# Patient Record
Sex: FEMALE | Race: WHITE | URBAN - METROPOLITAN AREA
[De-identification: names, ages, dates, MRNs, and addresses within clinical notes are randomized per-mention and may not be internally consistent; named-entity substitution may affect disease eponyms.]

---

## 2020-07-25 ENCOUNTER — TELEPHONE ENCOUNTER (OUTPATIENT)
Dept: URBAN - METROPOLITAN AREA CLINIC 13 | Facility: CLINIC | Age: 40
End: 2020-07-25

## 2020-07-26 ENCOUNTER — TELEPHONE ENCOUNTER (OUTPATIENT)
Dept: URBAN - METROPOLITAN AREA CLINIC 13 | Facility: CLINIC | Age: 40
End: 2020-07-26

## 2020-07-26 RX ORDER — PHENTERMINE HYDROCHLORIDE 30 MG/1
TAKE 1 TABLET BY MOUTH EVERY MORNING TABLET ORAL
Qty: 30 | Refills: 0 | Status: ACTIVE | COMMUNITY
Start: 2019-04-02

## 2020-07-26 RX ORDER — CEPHALEXIN 500 MG/1
CAPSULE ORAL
Qty: 21 | Refills: 0 | Status: ACTIVE | COMMUNITY
Start: 2019-06-14

## 2020-07-26 RX ORDER — SPIRONOLACTONE 25 MG/1
TAKE ONE TABLET TWICE DAILY TABLET ORAL
Qty: 60 | Refills: 0 | Status: ACTIVE | COMMUNITY
Start: 2018-11-08

## 2020-07-26 RX ORDER — TRIAMTERENE AND HYDROCHLOROTHIAZIDE 37.5; 25 MG/1; MG/1
TABLET ORAL
Qty: 30 | Refills: 0 | Status: ACTIVE | COMMUNITY
Start: 2019-02-04

## 2020-07-26 RX ORDER — BUSPIRONE HYDROCHLORIDE 10 MG/1
TABLET ORAL
Qty: 60 | Refills: 0 | Status: ACTIVE | COMMUNITY
Start: 2019-05-13

## 2020-07-26 RX ORDER — FLUCONAZOLE 200 MG/1
TABLET ORAL
Qty: 3 | Refills: 0 | Status: ACTIVE | COMMUNITY
Start: 2019-06-14

## 2020-07-26 RX ORDER — CIPROFLOXACIN HYDROCHLORIDE 500 MG/1
TABLET, FILM COATED ORAL
Qty: 20 | Refills: 0 | Status: ACTIVE | COMMUNITY
Start: 2019-05-05

## 2020-07-26 RX ORDER — FLUCONAZOLE 150 MG/1
TABLET ORAL
Qty: 3 | Refills: 0 | Status: ACTIVE | COMMUNITY
Start: 2019-04-13

## 2020-07-26 RX ORDER — AZITHROMYCIN DIHYDRATE 250 MG/1
TAKE 2 TABLETS BY MOUTH ON DAY 1, THEN TAKE 1 TABLET DAILY ON DAYS 2-5 TABLET, FILM COATED ORAL
Qty: 6 | Refills: 0 | Status: ACTIVE | COMMUNITY
Start: 2018-08-24

## 2020-07-26 RX ORDER — SPIRONOLACTONE 100 MG/1
TABLET, FILM COATED ORAL
Qty: 60 | Refills: 0 | Status: ACTIVE | COMMUNITY
Start: 2019-04-29

## 2020-07-26 RX ORDER — ESCITALOPRAM 10 MG/1
TABLET, FILM COATED ORAL
Qty: 30 | Refills: 0 | Status: ACTIVE | COMMUNITY
Start: 2019-06-14

## 2020-07-26 RX ORDER — PHENAZOPYRIDINE HYDROCHLORIDE 200 MG/1
TABLET, FILM COATED ORAL
Qty: 6 | Refills: 0 | Status: ACTIVE | COMMUNITY
Start: 2019-05-05

## 2020-07-26 RX ORDER — NITROFURANTOIN MONOHYDRATE/MACROCRYSTALLINE 25; 75 MG/1; MG/1
CAPSULE ORAL
Qty: 14 | Refills: 0 | Status: ACTIVE | COMMUNITY
Start: 2019-04-13

## 2020-07-26 RX ORDER — ESCITALOPRAM 20 MG/1
TABLET, FILM COATED ORAL
Qty: 30 | Refills: 0 | Status: ACTIVE | COMMUNITY
Start: 2019-04-15

## 2020-07-26 RX ORDER — NORETHINDRONE ACETATE 5 MG/1
TAKE ONE TABLET EVERY DAY TABLET ORAL
Qty: 30 | Refills: 0 | Status: ACTIVE | COMMUNITY
Start: 2018-11-08

## 2020-07-26 RX ORDER — SULFAMETHOXAZOLE AND TRIMETHOPRIM 800; 160 MG/1; MG/1
TK 1 T PO  BID FOR 10 DAYS TABLET ORAL
Qty: 20 | Refills: 0 | Status: ACTIVE | COMMUNITY
Start: 2018-09-22

## 2020-07-26 RX ORDER — LEVOTHYROXINE, LIOTHYRONINE 38; 9 UG/1; UG/1
TABLET ORAL
Qty: 30 | Refills: 0 | Status: ACTIVE | COMMUNITY
Start: 2019-04-30

## 2023-02-08 ENCOUNTER — OFFICE VISIT (OUTPATIENT)
Dept: URBAN - METROPOLITAN AREA CLINIC 107 | Facility: CLINIC | Age: 43
End: 2023-02-08

## 2023-02-08 RX ORDER — LEVOTHYROXINE, LIOTHYRONINE 38; 9 UG/1; UG/1
TABLET ORAL
Qty: 30 | Refills: 0 | Status: ACTIVE | COMMUNITY
Start: 2019-04-30

## 2023-02-08 RX ORDER — NORETHINDRONE ACETATE 5 MG/1
TAKE ONE TABLET EVERY DAY TABLET ORAL
Qty: 30 | Refills: 0 | Status: ACTIVE | COMMUNITY
Start: 2018-11-08

## 2023-02-08 RX ORDER — ESCITALOPRAM 20 MG/1
TABLET, FILM COATED ORAL
Qty: 30 | Refills: 0 | Status: ACTIVE | COMMUNITY
Start: 2019-04-15

## 2023-02-08 RX ORDER — CEPHALEXIN 500 MG/1
CAPSULE ORAL
Qty: 21 | Refills: 0 | Status: ACTIVE | COMMUNITY
Start: 2019-06-14

## 2023-02-08 RX ORDER — PHENAZOPYRIDINE HYDROCHLORIDE 200 MG/1
TABLET, FILM COATED ORAL
Qty: 6 | Refills: 0 | Status: ACTIVE | COMMUNITY
Start: 2019-05-05

## 2023-02-08 RX ORDER — SPIRONOLACTONE 100 MG/1
TABLET, FILM COATED ORAL
Qty: 60 | Refills: 0 | Status: ACTIVE | COMMUNITY
Start: 2019-04-29

## 2023-02-08 RX ORDER — FLUCONAZOLE 200 MG/1
TABLET ORAL
Qty: 3 | Refills: 0 | Status: ACTIVE | COMMUNITY
Start: 2019-06-14

## 2023-02-08 RX ORDER — TRIAMTERENE AND HYDROCHLOROTHIAZIDE 37.5; 25 MG/1; MG/1
TABLET ORAL
Qty: 30 | Refills: 0 | Status: ACTIVE | COMMUNITY
Start: 2019-02-04

## 2023-02-08 RX ORDER — SULFAMETHOXAZOLE AND TRIMETHOPRIM 800; 160 MG/1; MG/1
TK 1 T PO  BID FOR 10 DAYS TABLET ORAL
Qty: 20 | Refills: 0 | Status: ACTIVE | COMMUNITY
Start: 2018-09-22

## 2023-02-08 RX ORDER — NITROFURANTOIN MONOHYDRATE/MACROCRYSTALLINE 25; 75 MG/1; MG/1
CAPSULE ORAL
Qty: 14 | Refills: 0 | Status: ACTIVE | COMMUNITY
Start: 2019-04-13

## 2023-02-08 RX ORDER — ESCITALOPRAM 10 MG/1
TABLET, FILM COATED ORAL
Qty: 30 | Refills: 0 | Status: ACTIVE | COMMUNITY
Start: 2019-06-14

## 2023-02-08 RX ORDER — AZITHROMYCIN DIHYDRATE 250 MG/1
TAKE 2 TABLETS BY MOUTH ON DAY 1, THEN TAKE 1 TABLET DAILY ON DAYS 2-5 TABLET, FILM COATED ORAL
Qty: 6 | Refills: 0 | Status: ACTIVE | COMMUNITY
Start: 2018-08-24

## 2023-02-08 RX ORDER — SPIRONOLACTONE 25 MG/1
TAKE ONE TABLET TWICE DAILY TABLET ORAL
Qty: 60 | Refills: 0 | Status: ACTIVE | COMMUNITY
Start: 2018-11-08

## 2023-02-08 RX ORDER — CIPROFLOXACIN HYDROCHLORIDE 500 MG/1
TABLET, FILM COATED ORAL
Qty: 20 | Refills: 0 | Status: ACTIVE | COMMUNITY
Start: 2019-05-05

## 2023-02-08 RX ORDER — PHENTERMINE HYDROCHLORIDE 30 MG/1
TAKE 1 TABLET BY MOUTH EVERY MORNING TABLET ORAL
Qty: 30 | Refills: 0 | Status: ACTIVE | COMMUNITY
Start: 2019-04-02

## 2023-02-08 RX ORDER — BUSPIRONE HYDROCHLORIDE 10 MG/1
TABLET ORAL
Qty: 60 | Refills: 0 | Status: ACTIVE | COMMUNITY
Start: 2019-05-13

## 2023-02-08 RX ORDER — FLUCONAZOLE 150 MG/1
TABLET ORAL
Qty: 3 | Refills: 0 | Status: ACTIVE | COMMUNITY
Start: 2019-04-13

## 2023-02-08 NOTE — HPI-TODAY'S VISIT:
43yo female referred by Dr. Keira Starr for evaluation of diarrhea and rectal bleeding.  A copy of this document is being forwarded to the referring provider.  She was previously seen in our office in 2004 by Dr. East for colitis suspected to be related to Crohn's. She was briefly treated with Asacol during that time, but ultimately lost to office follow-up.

## 2023-02-09 ENCOUNTER — OFFICE VISIT (OUTPATIENT)
Dept: URBAN - METROPOLITAN AREA CLINIC 113 | Facility: CLINIC | Age: 43
End: 2023-02-09

## 2023-02-14 ENCOUNTER — WEB ENCOUNTER (OUTPATIENT)
Dept: URBAN - METROPOLITAN AREA CLINIC 107 | Facility: CLINIC | Age: 43
End: 2023-02-14

## 2023-02-20 ENCOUNTER — OFFICE VISIT (OUTPATIENT)
Dept: URBAN - METROPOLITAN AREA CLINIC 107 | Facility: CLINIC | Age: 43
End: 2023-02-20
Payer: COMMERCIAL

## 2023-02-20 VITALS
RESPIRATION RATE: 8 BRPM | SYSTOLIC BLOOD PRESSURE: 111 MMHG | BODY MASS INDEX: 27.28 KG/M2 | WEIGHT: 180 LBS | TEMPERATURE: 98.2 F | HEIGHT: 68 IN | DIASTOLIC BLOOD PRESSURE: 74 MMHG | HEART RATE: 71 BPM

## 2023-02-20 DIAGNOSIS — K60.2 ANAL FISSURE: ICD-10-CM

## 2023-02-20 DIAGNOSIS — R93.3 ABNORMAL COLONOSCOPY: ICD-10-CM

## 2023-02-20 DIAGNOSIS — R10.12 LUQ PAIN: ICD-10-CM

## 2023-02-20 PROCEDURE — 99204 OFFICE O/P NEW MOD 45 MIN: CPT | Performed by: STUDENT IN AN ORGANIZED HEALTH CARE EDUCATION/TRAINING PROGRAM

## 2023-02-20 RX ORDER — DEXTROAMPHETAMINE SACCHARATE, AMPHETAMINE ASPARTATE, DEXTROAMPHETAMINE SULFATE, AND AMPHETAMINE SULFATE 2.5; 2.5; 2.5; 2.5 MG/1; MG/1; MG/1; MG/1
1 TABLET TABLET ORAL TWICE A DAY
Status: ACTIVE | COMMUNITY

## 2023-02-20 RX ORDER — PROPRANOLOL HYDROCHLORIDE 60 MG/1
1 TABLET TABLET ORAL ONCE A DAY
Status: ACTIVE | COMMUNITY

## 2023-02-20 RX ORDER — DEXTROAMPHETAMINE SACCHARATE, AMPHETAMINE ASPARTATE, DEXTROAMPHETAMINE SULFATE, AND AMPHETAMINE SULFATE 5; 5; 5; 5 MG/1; MG/1; MG/1; MG/1
1 TABLET TABLET ORAL TWICE A DAY
Status: ACTIVE | COMMUNITY

## 2023-02-20 RX ORDER — PANTOPRAZOLE SODIUM 40 MG/1
1 TABLET TABLET, DELAYED RELEASE ORAL ONCE A DAY
Qty: 90 TABLET | Refills: 0 | OUTPATIENT
Start: 2023-02-20

## 2023-02-20 NOTE — HPI-SCREENING
Initial visit 2/20/2023; PCP Dr. Starr Ms. Dia is a 42-year-old female with essential tremor who presents for evaluation of rectal bleeding and rectal pain. Based on records her last endoscopy was a flex sig 11/6/2003 performed for colitis noted multiple aphthous ulcers with erythema and mucosal friability in a patchy distribution involving the rectum and sigmoid colon.  Biopsies obtained consistent with acute colitis with focal crypt destruction. She was not given a formal diagnosis of IBD. Patient had given birth in 2014 and at that time was diagnosed with bleeding hemorrhoids. She had seen colorectal surgery to did a surgical procedure for her and she feels that her sphincter is now tight however bleeding had resolved, unclear what procedure was perfored for her however she states the recover process was painful. 4 months ago bleeding and rectal pain returned, she will have bleeding in her underwear and in the toilet bowl even without bowel movements. REctal pain with defecation described as sharp. She was given steroid suppository with no improvement. No weight loss. She also complains of an intermittent sharp LUQ pain, not associated with meals or bowel movements. Ongoing for 4 months. No family history og GI malignancies. No fevers/chills, nausea/vomiting, dysphagia/odynophagia, diarrhea/constipation.

## 2023-02-20 NOTE — PHYSICAL EXAM GASTROINTESTINAL
Abdomen , soft, nontender, nondistended , no guarding or rigidity , no masses palpable , normal bowel sounds , Liver and Spleen , no hepatomegaly present , no hepatosplenomegaly , liver nontender , spleen not palpable , Rectal , posterior anal fissure, external anal skin tags, increased sphinter tone, no masses on ANUM, no blood on gloved finger

## 2023-04-04 ENCOUNTER — WEB ENCOUNTER (OUTPATIENT)
Dept: URBAN - METROPOLITAN AREA CLINIC 107 | Facility: CLINIC | Age: 43
End: 2023-04-04

## 2023-04-04 ENCOUNTER — OFFICE VISIT (OUTPATIENT)
Dept: URBAN - METROPOLITAN AREA CLINIC 107 | Facility: CLINIC | Age: 43
End: 2023-04-04
Payer: COMMERCIAL

## 2023-04-04 VITALS
TEMPERATURE: 96.7 F | SYSTOLIC BLOOD PRESSURE: 115 MMHG | HEIGHT: 68 IN | BODY MASS INDEX: 28.04 KG/M2 | HEART RATE: 74 BPM | WEIGHT: 185 LBS | DIASTOLIC BLOOD PRESSURE: 80 MMHG | RESPIRATION RATE: 18 BRPM

## 2023-04-04 DIAGNOSIS — R93.3 ABNORMAL COLONOSCOPY: ICD-10-CM

## 2023-04-04 DIAGNOSIS — K62.5 RECTAL BLEEDING: ICD-10-CM

## 2023-04-04 DIAGNOSIS — K62.89 RECTAL PAIN: ICD-10-CM

## 2023-04-04 DIAGNOSIS — K60.2 ANAL FISSURE: ICD-10-CM

## 2023-04-04 DIAGNOSIS — R10.12 LUQ PAIN: ICD-10-CM

## 2023-04-04 PROCEDURE — 99214 OFFICE O/P EST MOD 30 MIN: CPT | Performed by: INTERNAL MEDICINE

## 2023-04-04 RX ORDER — PANTOPRAZOLE SODIUM 40 MG/1
1 TABLET TABLET, DELAYED RELEASE ORAL ONCE A DAY
Qty: 90 TABLET | Refills: 0 | Status: ON HOLD | COMMUNITY
Start: 2023-02-20

## 2023-04-04 RX ORDER — DEXTROAMPHETAMINE SACCHARATE, AMPHETAMINE ASPARTATE, DEXTROAMPHETAMINE SULFATE, AND AMPHETAMINE SULFATE 2.5; 2.5; 2.5; 2.5 MG/1; MG/1; MG/1; MG/1
1 TABLET TABLET ORAL TWICE A DAY
Status: ACTIVE | COMMUNITY

## 2023-04-04 RX ORDER — SODIUM, POTASSIUM,MAG SULFATES 17.5-3.13G
177ML SOLUTION, RECONSTITUTED, ORAL ORAL
Qty: 1 | OUTPATIENT
Start: 2023-04-04 | End: 2023-04-06

## 2023-04-04 RX ORDER — DEXTROAMPHETAMINE SACCHARATE, AMPHETAMINE ASPARTATE, DEXTROAMPHETAMINE SULFATE, AND AMPHETAMINE SULFATE 5; 5; 5; 5 MG/1; MG/1; MG/1; MG/1
1 TABLET TABLET ORAL TWICE A DAY
Status: ACTIVE | COMMUNITY

## 2023-04-04 RX ORDER — PROPRANOLOL HYDROCHLORIDE 60 MG/1
1 TABLET TABLET ORAL ONCE A DAY
Status: ACTIVE | COMMUNITY

## 2023-04-04 NOTE — HPI-SCREENING
41 y/o female presenting for f/u. She was seen by Dr. East in the distant past and most recently seen by Dr. Keating in Feb 2023. She was treated for an anal fissure with the plan to f/u and schedule a colonoscopy. She had an abnormal flex sig in 2023. She is here today for f/u. She does continue to have rectal bleeding and rectal pain. She has had diarrhea but not daily.   She has been on a PPI for LUQ abdominal pain but she continues to have pain. It is random squeezing-type pain.   2/20/23 Initial visit 2/20/2023; PCP Dr. Starr Ms. Dia is a 42-year-old female with essential tremor who presents for evaluation of rectal bleeding and rectal pain. Based on records her last endoscopy was a flex sig 11/6/2003 performed for colitis noted multiple aphthous ulcers with erythema and mucosal friability in a patchy distribution involving the rectum and sigmoid colon.  Biopsies obtained consistent with acute colitis with focal crypt destruction. She was not given a formal diagnosis of IBD. Patient had given birth in 2014 and at that time was diagnosed with bleeding hemorrhoids. She had seen colorectal surgery to did a surgical procedure for her and she feels that her sphincter is now tight however bleeding had resolved, unclear what procedure was perfored for her however she states the recover process was painful. 4 months ago bleeding and rectal pain returned, she will have bleeding in her underwear and in the toilet bowl even without bowel movements. REctal pain with defecation described as sharp. She was given steroid suppository with no improvement. No weight loss. She also complains of an intermittent sharp LUQ pain, not associated with meals or bowel movements. Ongoing for 4 months. No family history og GI malignancies. No fevers/chills, nausea/vomiting, dysphagia/odynophagia, diarrhea/constipation.

## 2023-04-11 ENCOUNTER — OFFICE VISIT (OUTPATIENT)
Dept: URBAN - METROPOLITAN AREA SURGERY CENTER 25 | Facility: SURGERY CENTER | Age: 43
End: 2023-04-11
Payer: COMMERCIAL

## 2023-04-11 ENCOUNTER — WEB ENCOUNTER (OUTPATIENT)
Dept: URBAN - METROPOLITAN AREA SURGERY CENTER 25 | Facility: SURGERY CENTER | Age: 43
End: 2023-04-11

## 2023-04-11 DIAGNOSIS — K62.5 RECTAL BLEEDING: ICD-10-CM

## 2023-04-11 DIAGNOSIS — D12.2 ADENOMA OF ASCENDING COLON: ICD-10-CM

## 2023-04-11 PROCEDURE — G8907 PT DOC NO EVENTS ON DISCHARG: HCPCS | Performed by: INTERNAL MEDICINE

## 2023-04-11 PROCEDURE — 45385 COLONOSCOPY W/LESION REMOVAL: CPT | Performed by: INTERNAL MEDICINE

## 2023-04-11 RX ORDER — DEXTROAMPHETAMINE SACCHARATE, AMPHETAMINE ASPARTATE, DEXTROAMPHETAMINE SULFATE, AND AMPHETAMINE SULFATE 2.5; 2.5; 2.5; 2.5 MG/1; MG/1; MG/1; MG/1
1 TABLET TABLET ORAL TWICE A DAY
Status: ACTIVE | COMMUNITY

## 2023-04-11 RX ORDER — PROPRANOLOL HYDROCHLORIDE 60 MG/1
1 TABLET TABLET ORAL ONCE A DAY
Status: ACTIVE | COMMUNITY

## 2023-04-11 RX ORDER — PANTOPRAZOLE SODIUM 40 MG/1
1 TABLET TABLET, DELAYED RELEASE ORAL ONCE A DAY
Qty: 90 TABLET | Refills: 0 | Status: ON HOLD | COMMUNITY
Start: 2023-02-20

## 2023-04-11 RX ORDER — DEXTROAMPHETAMINE SACCHARATE, AMPHETAMINE ASPARTATE, DEXTROAMPHETAMINE SULFATE, AND AMPHETAMINE SULFATE 5; 5; 5; 5 MG/1; MG/1; MG/1; MG/1
1 TABLET TABLET ORAL TWICE A DAY
Status: ACTIVE | COMMUNITY

## 2023-07-14 ENCOUNTER — DASHBOARD ENCOUNTERS (OUTPATIENT)
Age: 43
End: 2023-07-14

## 2023-07-14 ENCOUNTER — OFFICE VISIT (OUTPATIENT)
Dept: URBAN - METROPOLITAN AREA CLINIC 107 | Facility: CLINIC | Age: 43
End: 2023-07-14
Payer: COMMERCIAL

## 2023-07-14 VITALS
HEART RATE: 74 BPM | RESPIRATION RATE: 18 BRPM | TEMPERATURE: 97.7 F | DIASTOLIC BLOOD PRESSURE: 77 MMHG | SYSTOLIC BLOOD PRESSURE: 110 MMHG | BODY MASS INDEX: 27.58 KG/M2 | WEIGHT: 182 LBS | HEIGHT: 68 IN

## 2023-07-14 DIAGNOSIS — R10.12 LUQ PAIN: ICD-10-CM

## 2023-07-14 DIAGNOSIS — K60.2 ANAL FISSURE: ICD-10-CM

## 2023-07-14 DIAGNOSIS — K62.89 RECTAL PAIN: ICD-10-CM

## 2023-07-14 DIAGNOSIS — K62.5 RECTAL BLEEDING: ICD-10-CM

## 2023-07-14 PROCEDURE — 99214 OFFICE O/P EST MOD 30 MIN: CPT | Performed by: INTERNAL MEDICINE

## 2023-07-14 PROCEDURE — 46221 LIGATION OF HEMORRHOID(S): CPT | Performed by: INTERNAL MEDICINE

## 2023-07-14 RX ORDER — DICYCLOMINE HYDROCHLORIDE 10 MG/1
1 CAPSULE CAPSULE ORAL
Qty: 90 | Refills: 1 | OUTPATIENT
Start: 2023-07-14 | End: 2023-09-12

## 2023-07-14 RX ORDER — PROPRANOLOL HYDROCHLORIDE 60 MG/1
1 TABLET TABLET ORAL ONCE A DAY
Status: ACTIVE | COMMUNITY

## 2023-07-14 RX ORDER — DEXTROAMPHETAMINE SACCHARATE, AMPHETAMINE ASPARTATE, DEXTROAMPHETAMINE SULFATE, AND AMPHETAMINE SULFATE 2.5; 2.5; 2.5; 2.5 MG/1; MG/1; MG/1; MG/1
1 TABLET TABLET ORAL TWICE A DAY
Status: ACTIVE | COMMUNITY

## 2023-07-14 RX ORDER — DEXTROAMPHETAMINE SACCHARATE, AMPHETAMINE ASPARTATE, DEXTROAMPHETAMINE SULFATE, AND AMPHETAMINE SULFATE 5; 5; 5; 5 MG/1; MG/1; MG/1; MG/1
1 TABLET TABLET ORAL TWICE A DAY
Status: ACTIVE | COMMUNITY

## 2023-07-14 RX ORDER — PANTOPRAZOLE SODIUM 40 MG/1
1 TABLET TABLET, DELAYED RELEASE ORAL ONCE A DAY
Qty: 90 TABLET | Refills: 0 | Status: ON HOLD | COMMUNITY
Start: 2023-02-20

## 2023-07-14 NOTE — HPI-TODAY'S VISIT:
43 year old female presents for follow up after colonoscopy to rule out IBD. SHe was last seen on 4/4/2023.   Colonoscopy 4/11/2023: performed without difficulty. BBPS score of 9. Grade II internal hemorrhoids. 8 mm polyp found in the ascending colon. Pathology revealed sessile serrated adenoma. Distal rectum and anal verge are normal.   She continues to experience rectal bleeding noted as blood clots and rectal discomfort. She has regular bowel movements daily. Stools are soft. She has intermittent LUQ pain described as catching. Denies nausea or vomiting. She does admit to excess stress over the last 3 years. She was involved in an affair and has lived with "shame and depression." She is concerned this stress may be contributing to her symptoms.  4/4/2023 41 y/o female presenting for f/u. She was seen by Dr. East in the distant past and most recently seen by Dr. Keating in Feb 2023. She was treated for an anal fissure with the plan to f/u and schedule a colonoscopy. She had an abnormal flex sig in 2023. She is here today for f/u. She does continue to have rectal bleeding and rectal pain. She has had diarrhea but not daily.   She has been on a PPI for LUQ abdominal pain, but she continues to have pain. It is random squeezing-type pain.   2/20/23 Initial visit 2/20/2023; PCP Dr. Starr Ms. Dia is a 42-year-old female with essential tremor who presents for evaluation of rectal bleeding and rectal pain. Based on records her last endoscopy was a flex sig 11/6/2003 performed for colitis noted multiple aphthous ulcers with erythema and mucosal friability in a patchy distribution involving the rectum and sigmoid colon.  Biopsies obtained consistent with acute colitis with focal crypt destruction. She was not given a formal diagnosis of IBD. Patient had given birth in 2014 and at that time was diagnosed with bleeding hemorrhoids. She had seen colorectal surgery to did a surgical procedure for her and she feels that her sphincter is now tight however bleeding had resolved, unclear what procedure was perfored for her however she states the recover process was painful. 4 months ago bleeding and rectal pain returned, she will have bleeding in her underwear and in the toilet bowl even without bowel movements. REctal pain with defecation described as sharp. She was given steroid suppository with no improvement. No weight loss. She also complains of an intermittent sharp LUQ pain, not associated with meals or bowel movements. Ongoing for 4 months. No family history og GI malignancies. No fevers/chills, nausea/vomiting, dysphagia/odynophagia, diarrhea/constipation.

## 2023-07-19 ENCOUNTER — TELEPHONE ENCOUNTER (OUTPATIENT)
Dept: URBAN - METROPOLITAN AREA CLINIC 113 | Facility: CLINIC | Age: 43
End: 2023-07-19

## 2023-08-08 ENCOUNTER — CLAIMS CREATED FROM THE CLAIM WINDOW (OUTPATIENT)
Dept: URBAN - METROPOLITAN AREA CLINIC 4 | Facility: CLINIC | Age: 43
End: 2023-08-08
Payer: COMMERCIAL

## 2023-08-08 ENCOUNTER — OUT OF OFFICE VISIT (OUTPATIENT)
Dept: URBAN - METROPOLITAN AREA SURGERY CENTER 25 | Facility: SURGERY CENTER | Age: 43
End: 2023-08-08
Payer: COMMERCIAL

## 2023-08-08 DIAGNOSIS — K29.70 GASTRITIS, UNSPECIFIED, WITHOUT BLEEDING: ICD-10-CM

## 2023-08-08 DIAGNOSIS — K29.70 CHRONIC ACITVE GASTRITIS (H.PYLORI NEGATIVE): ICD-10-CM

## 2023-08-08 DIAGNOSIS — R10.13 EPIGASTRIC PAIN: ICD-10-CM

## 2023-08-08 DIAGNOSIS — R12 HEARTBURN: ICD-10-CM

## 2023-08-08 DIAGNOSIS — R12 BURNING REFLUX: ICD-10-CM

## 2023-08-08 PROCEDURE — 00731 ANES UPR GI NDSC PX NOS: CPT | Performed by: ANESTHESIOLOGY

## 2023-08-08 PROCEDURE — 88312 SPECIAL STAINS GROUP 1: CPT | Performed by: PATHOLOGY

## 2023-08-08 PROCEDURE — 43239 EGD BIOPSY SINGLE/MULTIPLE: CPT | Performed by: INTERNAL MEDICINE

## 2023-08-08 PROCEDURE — 00731 ANES UPR GI NDSC PX NOS: CPT | Performed by: NURSE ANESTHETIST, CERTIFIED REGISTERED

## 2023-08-08 PROCEDURE — G8907 PT DOC NO EVENTS ON DISCHARG: HCPCS | Performed by: INTERNAL MEDICINE

## 2023-08-08 PROCEDURE — 88305 TISSUE EXAM BY PATHOLOGIST: CPT | Performed by: PATHOLOGY

## 2023-08-08 RX ORDER — PANTOPRAZOLE SODIUM 40 MG/1
1 TABLET TABLET, DELAYED RELEASE ORAL ONCE A DAY
Qty: 90 TABLET | Refills: 0 | Status: ON HOLD | COMMUNITY
Start: 2023-02-20

## 2023-08-08 RX ORDER — PROPRANOLOL HYDROCHLORIDE 60 MG/1
1 TABLET TABLET ORAL ONCE A DAY
Status: ACTIVE | COMMUNITY

## 2023-08-08 RX ORDER — DEXTROAMPHETAMINE SACCHARATE, AMPHETAMINE ASPARTATE, DEXTROAMPHETAMINE SULFATE, AND AMPHETAMINE SULFATE 5; 5; 5; 5 MG/1; MG/1; MG/1; MG/1
1 TABLET TABLET ORAL TWICE A DAY
Status: ACTIVE | COMMUNITY

## 2023-08-08 RX ORDER — DEXTROAMPHETAMINE SACCHARATE, AMPHETAMINE ASPARTATE, DEXTROAMPHETAMINE SULFATE, AND AMPHETAMINE SULFATE 2.5; 2.5; 2.5; 2.5 MG/1; MG/1; MG/1; MG/1
1 TABLET TABLET ORAL TWICE A DAY
Status: ACTIVE | COMMUNITY

## 2023-08-08 RX ORDER — DICYCLOMINE HYDROCHLORIDE 10 MG/1
1 CAPSULE CAPSULE ORAL
Qty: 90 | Refills: 1 | Status: ACTIVE | COMMUNITY
Start: 2023-07-14 | End: 2023-09-12

## 2023-08-30 ENCOUNTER — OFFICE VISIT (OUTPATIENT)
Dept: URBAN - METROPOLITAN AREA CLINIC 107 | Facility: CLINIC | Age: 43
End: 2023-08-30

## 2023-09-13 ENCOUNTER — OFFICE VISIT (OUTPATIENT)
Dept: URBAN - METROPOLITAN AREA CLINIC 113 | Facility: CLINIC | Age: 43
End: 2023-09-13

## 2023-09-14 ENCOUNTER — OFFICE VISIT (OUTPATIENT)
Dept: URBAN - METROPOLITAN AREA CLINIC 113 | Facility: CLINIC | Age: 43
End: 2023-09-14

## 2023-09-14 NOTE — HPI-TODAY'S VISIT:
43-year-old female presents for follow-up.  She was last seen on 7/14/2023.  She was to proceed with hemorrhoid banding.  Regarding her intermittent left upper quadrant pain she was planned for labs and CT imaging as well as EGD to rule out peptic ulcer disease given excess psychosocial stressors. EGD 8/8/2023:Performed without difficulty.  Normal esophagus and duodenum.  Gastritis noted and biopsied.  Pathology revealed chemical/reactive gastropathy, negative for H. pylori or intestinal metaplasia. We do not have labs or CT imaging for review. All 3 hemorrhoid banding sessions have been canceled for unknown reasons.  7/14/2023: 43 year old female presents for follow up after colonoscopy to rule out IBD. SHe was last seen on 4/4/2023.   Colonoscopy 4/11/2023: performed without difficulty. BBPS score of 9. Grade II internal hemorrhoids. 8 mm polyp found in the ascending colon. Pathology revealed sessile serrated adenoma. Distal rectum and anal verge are normal.   She continues to experience rectal bleeding noted as blood clots and rectal discomfort. She has regular bowel movements daily. Stools are soft. She has intermittent LUQ pain described as catching. Denies nausea or vomiting. She does admit to excess stress over the last 3 years. She was involved in an affair and has lived with "shame and depression." She is concerned this stress may be contributing to her symptoms.  4/4/2023 41 y/o female presenting for f/u. She was seen by Dr. East in the distant past and most recently seen by Dr. Keating in Feb 2023. She was treated for an anal fissure with the plan to f/u and schedule a colonoscopy. She had an abnormal flex sig in 2023. She is here today for f/u. She does continue to have rectal bleeding and rectal pain. She has had diarrhea but not daily.   She has been on a PPI for LUQ abdominal pain, but she continues to have pain. It is random squeezing-type pain.   2/20/23 Initial visit 2/20/2023; PCP Dr. Starr Ms. Dia is a 42-year-old female with essential tremor who presents for evaluation of rectal bleeding and rectal pain. Based on records her last endoscopy was a flex sig 11/6/2003 performed for colitis noted multiple aphthous ulcers with erythema and mucosal friability in a patchy distribution involving the rectum and sigmoid colon.  Biopsies obtained consistent with acute colitis with focal crypt destruction. She was not given a formal diagnosis of IBD. Patient had given birth in 2014 and at that time was diagnosed with bleeding hemorrhoids. She had seen colorectal surgery to did a surgical procedure for her and she feels that her sphincter is now tight however bleeding had resolved, unclear what procedure was perfored for her however she states the recover process was painful. 4 months ago bleeding and rectal pain returned, she will have bleeding in her underwear and in the toilet bowl even without bowel movements. REctal pain with defecation described as sharp. She was given steroid suppository with no improvement. No weight loss. She also complains of an intermittent sharp LUQ pain, not associated with meals or bowel movements. Ongoing for 4 months. No family history og GI malignancies. No fevers/chills, nausea/vomiting, dysphagia/odynophagia, diarrhea/constipation.

## 2023-09-27 ENCOUNTER — OFFICE VISIT (OUTPATIENT)
Dept: URBAN - METROPOLITAN AREA CLINIC 107 | Facility: CLINIC | Age: 43
End: 2023-09-27